# Patient Record
Sex: MALE | Race: WHITE | URBAN - METROPOLITAN AREA
[De-identification: names, ages, dates, MRNs, and addresses within clinical notes are randomized per-mention and may not be internally consistent; named-entity substitution may affect disease eponyms.]

---

## 2017-06-26 ENCOUNTER — IMPORTED ENCOUNTER (OUTPATIENT)
Dept: URBAN - METROPOLITAN AREA CLINIC 50 | Facility: CLINIC | Age: 72
End: 2017-06-26

## 2017-10-06 ENCOUNTER — IMPORTED ENCOUNTER (OUTPATIENT)
Dept: URBAN - METROPOLITAN AREA CLINIC 50 | Facility: CLINIC | Age: 72
End: 2017-10-06

## 2017-12-12 ENCOUNTER — IMPORTED ENCOUNTER (OUTPATIENT)
Dept: URBAN - METROPOLITAN AREA CLINIC 50 | Facility: CLINIC | Age: 72
End: 2017-12-12

## 2018-01-04 ENCOUNTER — IMPORTED ENCOUNTER (OUTPATIENT)
Dept: URBAN - METROPOLITAN AREA CLINIC 50 | Facility: CLINIC | Age: 73
End: 2018-01-04

## 2018-01-09 ENCOUNTER — IMPORTED ENCOUNTER (OUTPATIENT)
Dept: URBAN - METROPOLITAN AREA CLINIC 50 | Facility: CLINIC | Age: 73
End: 2018-01-09

## 2018-01-16 ENCOUNTER — IMPORTED ENCOUNTER (OUTPATIENT)
Dept: URBAN - METROPOLITAN AREA CLINIC 50 | Facility: CLINIC | Age: 73
End: 2018-01-16

## 2018-01-23 ENCOUNTER — IMPORTED ENCOUNTER (OUTPATIENT)
Dept: URBAN - METROPOLITAN AREA CLINIC 50 | Facility: CLINIC | Age: 73
End: 2018-01-23

## 2018-02-01 ENCOUNTER — IMPORTED ENCOUNTER (OUTPATIENT)
Dept: URBAN - METROPOLITAN AREA CLINIC 50 | Facility: CLINIC | Age: 73
End: 2018-02-01

## 2018-02-15 ENCOUNTER — IMPORTED ENCOUNTER (OUTPATIENT)
Dept: URBAN - METROPOLITAN AREA CLINIC 50 | Facility: CLINIC | Age: 73
End: 2018-02-15

## 2018-02-15 NOTE — PATIENT DISCUSSION
"""S/P IOL OU: OD: Symfony Toric [id number] +20.5 @ 15Âº (Target: Distance)Femto/Arcs +TM. OS: Symfony Toric [id number] +21.0 @ 1Âº (Target: [place])/Arcs +TM. "

## 2018-04-20 ENCOUNTER — IMPORTED ENCOUNTER (OUTPATIENT)
Dept: URBAN - METROPOLITAN AREA CLINIC 50 | Facility: CLINIC | Age: 73
End: 2018-04-20

## 2018-07-27 ENCOUNTER — IMPORTED ENCOUNTER (OUTPATIENT)
Dept: URBAN - METROPOLITAN AREA CLINIC 50 | Facility: CLINIC | Age: 73
End: 2018-07-27

## 2018-08-16 ENCOUNTER — IMPORTED ENCOUNTER (OUTPATIENT)
Dept: URBAN - METROPOLITAN AREA CLINIC 50 | Facility: CLINIC | Age: 73
End: 2018-08-16

## 2019-08-26 ENCOUNTER — IMPORTED ENCOUNTER (OUTPATIENT)
Dept: URBAN - METROPOLITAN AREA CLINIC 50 | Facility: CLINIC | Age: 74
End: 2019-08-26

## 2020-10-28 NOTE — PROCEDURE NOTE: CLINICAL
PROCEDURE NOTE: Avastin () #1 OS. Diagnosis: Proliferative Diabetic Retinopathy. Anesthesia: Lidocaine 4%. Prep: Betadine Drops. Prior to injection, risks/benefits/alternatives discussed including infection, loss of vision, hemorrhage, cataract, glaucoma, retinal tears or detachment. The off-label status of Intravitreal Avastin also was reviewed. The patient wished to proceed with treatment. Topical anesthesia was induced with Alcaine. Additional anesthesia was achieved using drop(s) or injection checked above. a drop of Povidone-iodine 5% ophthalmic solution was instilled over the injection site and in the inferior fornix. Betadine prep was performed. Using the syringe provided, Avastin 1.25 mg in 0.05 cc was injected into the vitreous cavity. The needle was passed 3.0 mm posterior to the limbus in pseudophakic patients, and 3.5 mm posterior to the lumbus in phakic patients. The remainder of the Avastin in the single-use vial was then discarded in a medical waste disposal container. Unused medication was discarded. Patient tolerated procedure well. There were no complications. Injection time: *. Post-op instructions given. Expiration Date: 12/24/2020. The patient was instructed to return for re-evaluation in approximately 4-12 weeks depending on his/her condition and was told to call immediately if vision decreases and/or if his/her eye becomes red, painful, and/or light sensitive. The patient was instructed to go to the emergency room or call 911 if unable to reach the doctor within an hour or two of trying or calling. The patient was instructed to use Artificial Tears q.i.d. p.r.n for comfort. Sangeeta Ulloa PROCEDURE NOTE: Lucentis 0.3mg Sample #1 OD. Diagnosis: Proliferative Diabetic Retinopathy. Anesthesia: Lidocaine 4%. Prep: Betadine Flush. Prior to injection, risks/benefits/alternatives discussed including infection, loss of vision, hemorrhage, cataract, glaucoma, retinal tears or detachment and patient wished to proceed. Topical anesthesia was induced with Alcaine. Additional anesthesia was achieved using drop(s) or injection checked above. A drop of Povidone-iodine 5% ophthalmic solution was instilled over the injection site and in the inferior fornix. Betadine prep was performed. Using the syringe provided, Lucentis 0.3 mg in 0.05 cc was injected into the vitreous cavity. The needle was passed 3.0 mm posterior to the limbus in pseudophakic patients, and 3.5 mm posterior to the limbus in phakic patients. The remainder of the Lucentis 0.3mg in the single-use vial was then discarded in a medical waste disposal container. A single use vial of intravitreal Lucentis 0.3mg/0.05ml was used and excess discarded. The eye was irrigated with sterile irrigating solution. There were no complications. Patient tolerated the procedure well. Post procedure instructions given. Lot # *. Expiration date: *. Inventory Id: *. Injection Time *. The patient was instructed to return for re-evaluation in approximately 4-12 weeks depending on his/her condition and was told to call immediately if vision decreases and/or if his/her eye becomes red, painful, and/or light sensitive. The patient was instructed to go to the emergency room or call 911 if unable to reach the doctor within an hour or two of trying or calling. The patient was instructed to use Artificial Tears q.i.d. p.r.n for comfort. Sangeeta Ulloa

## 2020-10-28 NOTE — PATIENT DISCUSSION
Proliferative Diabetic Retinopathy OS -  Patient has active proliferative disease. Risks benefits alternatives to treaments discussed. Stressed the importance of regular followup and good control of BS BP and Lipids to avoid future complications. Arianne Pollen

## 2020-10-28 NOTE — PATIENT DISCUSSION
1.  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 Stressed the importance of regular followup and good control of BS BP and Lipids to avoid future complications. 2. Proliferative Diabetic Retinopathy OS -   Large Vitreal heme centrally--unable to see retina for any tears-- Refer to Retina for eval-- Patient has active proliferative disease. Risks benefits alternatives to treaments discussed. Stressed the importance of regular followup and good control of BS BP and Lipids to avoid future complications. .3.   Refer to Ret[facility] today

## 2020-10-28 NOTE — PATIENT DISCUSSION
AVASTIN 1.25 MG TODAY THN WITHIN 2 MONTHS TIMES 2-5 DOI. ONCE VIT HEME RESOLVES PLAN PRP VS PPV IF DOES NOT RESOLVE.

## 2021-04-17 ASSESSMENT — TONOMETRY
OS_IOP_MMHG: 13
OS_IOP_MMHG: 36
OD_IOP_MMHG: 14
OD_IOP_MMHG: 16
OS_IOP_MMHG: 16
OD_IOP_MMHG: 16
OD_IOP_MMHG: 12
OD_IOP_MMHG: 32
OS_IOP_MMHG: 15
OS_IOP_MMHG: 16
OS_IOP_MMHG: 14
OS_IOP_MMHG: 16
OD_IOP_MMHG: 15
OS_IOP_MMHG: 19
OD_IOP_MMHG: 18
OD_IOP_MMHG: 16
OD_IOP_MMHG: 16
OS_IOP_MMHG: 17
OS_IOP_MMHG: 18
OD_IOP_MMHG: 15

## 2021-04-17 ASSESSMENT — VISUAL ACUITY
OS_PH: @ 16 IN
OD_BAT: 20/80
OD_CC: J1+
OD_SC: 20/30
OS_BAT: 20/50
OD_SC: 20/50
OS_PH: @ 16 IN
OS_OTHER: >20/400. >20/400.
OD_CC: 20/20
OD_BAT: 20/50
OS_CC: J1+
OS_CC: J1+@ 18 IN
OS_SC: 20/30+2
OS_CC: 20/50
OD_BAT: 20/80
OD_CC: J1+
OD_CC: 20/30
OS_SC: 20/30-2
OS_OTHER: >20/400. >20/400.
OD_SC: 20/25
OS_OTHER: 20/50. 20/60.
OS_SC: 20/25
OS_BAT: 20/50
OS_CC: J2@ 16 IN
OD_OTHER: 20/50. 20/400.
OD_CC: 20/30-
OD_PH: @ 16 IN
OD_BAT: 20/20
OD_BAT: 20/30-
OD_OTHER: 20/80. >20/400.
OD_CC: J2@ 16 IN
OD_PH: @ 16 IN
OS_SC: 20/25-
OD_SC: 20/40
OD_OTHER: 20/80. >20/400.
OS_BAT: >20/400
OS_CC: 20/25
OD_CC: J1+@ 18 IN
OS_OTHER: >20/400.
OD_OTHER: 20/20. 20/25.
OS_PH: @ 20 IN
OS_BAT: >20/400
OS_BAT: >20/400
OS_OTHER: 20/50. 20/400.
OD_SC: 20/25
OS_CC: 20/30
OD_BAT: 20/80
OD_OTHER: 20/30-. 20/60-.
OD_OTHER: 20/80. >20/400.
OD_SC: 20/20
OD_CC: 20/25
OS_CC: 20/30+2
OS_CC: 20/30-
OS_CC: J1+

## 2021-05-04 ENCOUNTER — PREPPED CHART (OUTPATIENT)
Dept: URBAN - METROPOLITAN AREA CLINIC 48 | Facility: CLINIC | Age: 76
End: 2021-05-04

## 2021-05-07 ENCOUNTER — COMPREHENSIVE EXAM (OUTPATIENT)
Dept: URBAN - METROPOLITAN AREA CLINIC 53 | Facility: CLINIC | Age: 76
End: 2021-05-07

## 2021-05-07 DIAGNOSIS — H52.4: ICD-10-CM

## 2021-05-07 DIAGNOSIS — H35.373: ICD-10-CM

## 2021-05-07 DIAGNOSIS — H16.223: ICD-10-CM

## 2021-05-07 PROCEDURE — 92014 COMPRE OPH EXAM EST PT 1/>: CPT

## 2021-05-07 PROCEDURE — 92134 CPTRZ OPH DX IMG PST SGM RTA: CPT

## 2021-05-07 PROCEDURE — 92015 DETERMINE REFRACTIVE STATE: CPT

## 2021-05-07 ASSESSMENT — VISUAL ACUITY
OS_SC: 20/20
OU_CC: J1+
OU_SC: 20/20
OD_SC: 20/20
OD_GLARE: 20/50
OD_GLARE: 20/20

## 2021-05-07 ASSESSMENT — TONOMETRY
OD_IOP_MMHG: 14
OS_IOP_MMHG: 15

## 2022-05-06 ENCOUNTER — COMPREHENSIVE EXAM (OUTPATIENT)
Dept: URBAN - METROPOLITAN AREA CLINIC 53 | Facility: CLINIC | Age: 77
End: 2022-05-06

## 2022-05-06 DIAGNOSIS — H16.223: ICD-10-CM

## 2022-05-06 DIAGNOSIS — H35.373: ICD-10-CM

## 2022-05-06 PROCEDURE — 92014 COMPRE OPH EXAM EST PT 1/>: CPT

## 2022-05-06 PROCEDURE — 92134 CPTRZ OPH DX IMG PST SGM RTA: CPT

## 2022-05-06 ASSESSMENT — VISUAL ACUITY
OS_SC: 20/20
OD_GLARE: 20/20
OU_CC: J1+
OD_SC: 20/20
OD_GLARE: 20/20

## 2022-05-06 ASSESSMENT — TONOMETRY
OD_IOP_MMHG: 12
OS_IOP_MMHG: 13

## 2023-05-16 ENCOUNTER — COMPREHENSIVE EXAM (OUTPATIENT)
Dept: URBAN - METROPOLITAN AREA CLINIC 53 | Facility: CLINIC | Age: 78
End: 2023-05-16

## 2023-05-16 DIAGNOSIS — H43.812: ICD-10-CM

## 2023-05-16 DIAGNOSIS — H35.373: ICD-10-CM

## 2023-05-16 DIAGNOSIS — H16.223: ICD-10-CM

## 2023-05-16 DIAGNOSIS — Z96.1: ICD-10-CM

## 2023-05-16 PROCEDURE — 92015 DETERMINE REFRACTIVE STATE: CPT

## 2023-05-16 PROCEDURE — 92134 CPTRZ OPH DX IMG PST SGM RTA: CPT

## 2023-05-16 PROCEDURE — 92014 COMPRE OPH EXAM EST PT 1/>: CPT

## 2023-05-16 ASSESSMENT — KERATOMETRY
OD_K1POWER_DIOPTERS: 44.25
OS_K1POWER_DIOPTERS: 42.75
OS_AXISANGLE2_DEGREES: 177
OD_AXISANGLE_DEGREES: 107
OD_AXISANGLE2_DEGREES: 17
OS_K2POWER_DIOPTERS: 44.75
OS_AXISANGLE_DEGREES: 87
OD_K2POWER_DIOPTERS: 44.75

## 2023-05-16 ASSESSMENT — VISUAL ACUITY
OU_CC: J1+ @ 16"
OD_GLARE: 20/20
OD_SC: 20/20-2
OS_SC: 20/25
OD_GLARE: 20/20

## 2023-05-16 ASSESSMENT — TONOMETRY
OS_IOP_MMHG: 11
OD_IOP_MMHG: 12

## 2024-05-16 ENCOUNTER — COMPREHENSIVE EXAM (OUTPATIENT)
Dept: URBAN - METROPOLITAN AREA CLINIC 53 | Facility: CLINIC | Age: 79
End: 2024-05-16

## 2024-05-16 DIAGNOSIS — H26.491: ICD-10-CM

## 2024-05-16 DIAGNOSIS — H16.223: ICD-10-CM

## 2024-05-16 DIAGNOSIS — H43.812: ICD-10-CM

## 2024-05-16 DIAGNOSIS — H35.373: ICD-10-CM

## 2024-05-16 DIAGNOSIS — H52.4: ICD-10-CM

## 2024-05-16 PROCEDURE — 92015 DETERMINE REFRACTIVE STATE: CPT

## 2024-05-16 PROCEDURE — 92134 CPTRZ OPH DX IMG PST SGM RTA: CPT

## 2024-05-16 PROCEDURE — 99214 OFFICE O/P EST MOD 30 MIN: CPT

## 2024-05-16 ASSESSMENT — KERATOMETRY
OD_K2POWER_DIOPTERS: 44.75
OS_K1POWER_DIOPTERS: 42.75
OS_AXISANGLE2_DEGREES: 177
OD_AXISANGLE_DEGREES: 107
OD_AXISANGLE2_DEGREES: 17
OS_AXISANGLE_DEGREES: 87
OS_K2POWER_DIOPTERS: 44.75
OD_K1POWER_DIOPTERS: 44.25

## 2024-05-16 ASSESSMENT — VISUAL ACUITY
OS_CC: 20/30
OD_GLARE: 20/20
OD_CC: 20/25
OD_GLARE: 20/25
OU_CC: J1

## 2024-05-16 ASSESSMENT — TONOMETRY
OS_IOP_MMHG: 16
OD_IOP_MMHG: 16

## 2024-07-18 NOTE — PATIENT DISCUSSION
"""S/P IOL OD: Symfony Toric [id number] +20.5 @ 15Âº (Target: Distance) +Femto/Arcs +TM. Continue post operative instructions and drops per schedule. "" ""Continue Pred - Nepafenac both eyes twice a day.  """
Detail Level: Zone

## 2025-05-21 ENCOUNTER — COMPREHENSIVE EXAM (OUTPATIENT)
Age: 80
End: 2025-05-21

## 2025-05-21 DIAGNOSIS — H26.491: ICD-10-CM

## 2025-05-21 DIAGNOSIS — H43.812: ICD-10-CM

## 2025-05-21 DIAGNOSIS — H52.4: ICD-10-CM

## 2025-05-21 DIAGNOSIS — H35.373: ICD-10-CM

## 2025-05-21 DIAGNOSIS — H16.223: ICD-10-CM

## 2025-05-21 PROCEDURE — 92134 CPTRZ OPH DX IMG PST SGM RTA: CPT

## 2025-05-21 PROCEDURE — 92015 DETERMINE REFRACTIVE STATE: CPT

## 2025-05-21 PROCEDURE — 99214 OFFICE O/P EST MOD 30 MIN: CPT
